# Patient Record
Sex: MALE | Race: ASIAN | NOT HISPANIC OR LATINO | URBAN - METROPOLITAN AREA
[De-identification: names, ages, dates, MRNs, and addresses within clinical notes are randomized per-mention and may not be internally consistent; named-entity substitution may affect disease eponyms.]

---

## 2022-02-25 ENCOUNTER — OFFICE VISIT (OUTPATIENT)
Dept: URBAN - METROPOLITAN AREA CLINIC 78 | Facility: CLINIC | Age: 42
End: 2022-02-25
Payer: COMMERCIAL

## 2022-02-25 ENCOUNTER — LAB OUTSIDE AN ENCOUNTER (OUTPATIENT)
Dept: URBAN - METROPOLITAN AREA CLINIC 78 | Facility: CLINIC | Age: 42
End: 2022-02-25

## 2022-02-25 DIAGNOSIS — R79.89 ELEVATED LFTS: ICD-10-CM

## 2022-02-25 PROCEDURE — 99203 OFFICE O/P NEW LOW 30 MIN: CPT | Performed by: INTERNAL MEDICINE

## 2022-02-25 NOTE — HPI-TODAY'S VISIT:
Patient is here for eval of elevated LFTs His TB was elevated in 2019 In Dec his  AST 48 ALT 98 He visited [facility] in Dec and [facility]  A Now his albs AST 96   He is COVID neg Has been vaccinated with a booster  Denies alcohol use / Tattoos / blood transfusion

## 2022-03-04 ENCOUNTER — OFFICE VISIT (OUTPATIENT)
Dept: URBAN - METROPOLITAN AREA CLINIC 22 | Facility: CLINIC | Age: 42
End: 2022-03-04
Payer: COMMERCIAL

## 2022-03-04 DIAGNOSIS — E78.89 STEATOSIS: ICD-10-CM

## 2022-03-04 PROCEDURE — 76705 ECHO EXAM OF ABDOMEN: CPT | Performed by: INTERNAL MEDICINE

## 2022-03-08 ENCOUNTER — TELEPHONE ENCOUNTER (OUTPATIENT)
Dept: URBAN - METROPOLITAN AREA CLINIC 78 | Facility: CLINIC | Age: 42
End: 2022-03-08

## 2022-03-15 ENCOUNTER — LAB OUTSIDE AN ENCOUNTER (OUTPATIENT)
Dept: URBAN - METROPOLITAN AREA CLINIC 78 | Facility: CLINIC | Age: 42
End: 2022-03-15

## 2022-03-21 LAB
ACTIN (SMOOTH MUSCLE) ANTIBODY (IGG): <20
ANACHOICE(R) SCREEN: NEGATIVE
CERULOPLASMIN: 29
CHOL/HDLC RATIO: 4.7
CHOLESTEROL, TOTAL: 173
FERRITIN, SERUM: 128
HBSAG SCREEN: (no result)
HDL CHOLESTEROL: 37
HEP A AB, IGM: (no result)
HEP B CORE AB, IGM: (no result)
HEP C VIRUS AB: 0.01
HEPATITIS C ANTIBODY: (no result)
HEPATITIS D ANTIBODY,: NEGATIVE
IMMUNOGLOBULIN A: 62
INTERPRETATION: (no result)
LDL-CHOLESTEROL: 87
LKM-1 ANTIBODY (IGG): <=20
Lab: NEGATIVE
MITOCHONDRIAL (M2) ANTIBODY: (no result)
NON HDL CHOLESTEROL: 136
TISSUE TRANSGLUTAMINASE AB, IGA: <1
TRIGLYCERIDES: 369

## 2022-03-24 ENCOUNTER — OFFICE VISIT (OUTPATIENT)
Dept: URBAN - METROPOLITAN AREA SURGERY CENTER 15 | Facility: SURGERY CENTER | Age: 42
End: 2022-03-24

## 2022-03-25 ENCOUNTER — LAB OUTSIDE AN ENCOUNTER (OUTPATIENT)
Dept: URBAN - METROPOLITAN AREA CLINIC 78 | Facility: CLINIC | Age: 42
End: 2022-03-25

## 2022-03-25 ENCOUNTER — DASHBOARD ENCOUNTERS (OUTPATIENT)
Age: 42
End: 2022-03-25

## 2022-03-25 ENCOUNTER — OFFICE VISIT (OUTPATIENT)
Dept: URBAN - METROPOLITAN AREA CLINIC 78 | Facility: CLINIC | Age: 42
End: 2022-03-25
Payer: COMMERCIAL

## 2022-03-25 DIAGNOSIS — E78.1 HYPERTRIGLYCERIDEMIA: ICD-10-CM

## 2022-03-25 DIAGNOSIS — Z12.11 COLON CANCER SCREENING: ICD-10-CM

## 2022-03-25 DIAGNOSIS — R79.89 ELEVATED LFTS: ICD-10-CM

## 2022-03-25 DIAGNOSIS — Z83.71 FAMILY HISTORY OF COLONIC POLYPS: ICD-10-CM

## 2022-03-25 PROBLEM — 302870006: Status: ACTIVE | Noted: 2022-03-25

## 2022-03-25 PROCEDURE — 99214 OFFICE O/P EST MOD 30 MIN: CPT | Performed by: INTERNAL MEDICINE

## 2022-03-25 RX ORDER — SODIUM PICOSULFATE, MAGNESIUM OXIDE, AND ANHYDROUS CITRIC ACID 10; 3.5; 12 MG/160ML; G/160ML; G/160ML
160 ML LIQUID ORAL
Qty: 1 PACK | Refills: 0 | OUTPATIENT
Start: 2022-03-25 | End: 2022-03-26

## 2022-03-31 LAB
A/G RATIO: 2.1
ABSOLUTE BASOPHILS: 110
ABSOLUTE EOSINOPHILS: 518
ABSOLUTE LYMPHOCYTES: 2175
ABSOLUTE MONOCYTES: 737
ABSOLUTE NEUTROPHILS: 3760
ALBUMIN: 4.5
ALKALINE PHOSPHATASE: 47
ALT (SGPT): 32
AST (SGOT): 23
BASOPHILS: 1.5
BILIRUBIN, TOTAL: 1.2
BUN/CREATININE RATIO: (no result)
BUN: 15
CALCIUM: 9.6
CARBON DIOXIDE, TOTAL: 29
CHLORIDE: 102
CREATININE: 1
EGFR AFRICAN AMERICAN: 109
EGFR NON-AFR. AMERICAN: 94
EOSINOPHILS: 7.1
GLOBULIN, TOTAL: 2.1
GLUCOSE: 93
HEMATOCRIT: 45.9
HEMOGLOBIN: 16.5
LYMPHOCYTES: 29.8
MCH: 31.3
MCHC: 35.9
MCV: 87.1
MONOCYTES: 10.1
MPV: 10.1
NEUTROPHILS: 51.5
PLATELET COUNT: 277
POTASSIUM: 4.3
PROTEIN, TOTAL: 6.6
RDW: 13.1
RED BLOOD CELL COUNT: 5.27
SODIUM: 139
WHITE BLOOD CELL COUNT: 7.3

## 2022-06-07 ENCOUNTER — OFFICE VISIT (OUTPATIENT)
Dept: URBAN - METROPOLITAN AREA SURGERY CENTER 15 | Facility: SURGERY CENTER | Age: 42
End: 2022-06-07
Payer: COMMERCIAL

## 2022-06-07 DIAGNOSIS — Z83.71 FAMILY HISTORY OF COLON POLYPS: ICD-10-CM

## 2022-06-07 PROCEDURE — G0105 COLORECTAL SCRN; HI RISK IND: HCPCS | Performed by: INTERNAL MEDICINE

## 2022-06-07 PROCEDURE — G8907 PT DOC NO EVENTS ON DISCHARG: HCPCS | Performed by: INTERNAL MEDICINE
